# Patient Record
Sex: FEMALE | Race: ASIAN | NOT HISPANIC OR LATINO | URBAN - METROPOLITAN AREA
[De-identification: names, ages, dates, MRNs, and addresses within clinical notes are randomized per-mention and may not be internally consistent; named-entity substitution may affect disease eponyms.]

---

## 2024-01-01 ENCOUNTER — EMERGENCY (EMERGENCY)
Facility: HOSPITAL | Age: 54
LOS: 1 days | Discharge: HOME | End: 2024-01-01
Admitting: STUDENT IN AN ORGANIZED HEALTH CARE EDUCATION/TRAINING PROGRAM
Payer: MEDICAID

## 2024-01-01 VITALS
SYSTOLIC BLOOD PRESSURE: 156 MMHG | OXYGEN SATURATION: 98 % | WEIGHT: 100.09 LBS | HEIGHT: 55 IN | TEMPERATURE: 97 F | DIASTOLIC BLOOD PRESSURE: 88 MMHG | HEART RATE: 69 BPM | RESPIRATION RATE: 16 BRPM

## 2024-01-01 DIAGNOSIS — H11.32 CONJUNCTIVAL HEMORRHAGE, LEFT EYE: ICD-10-CM

## 2024-01-01 DIAGNOSIS — H57.89 OTHER SPECIFIED DISORDERS OF EYE AND ADNEXA: ICD-10-CM

## 2024-01-01 PROCEDURE — 99283 EMERGENCY DEPT VISIT LOW MDM: CPT

## 2024-01-01 NOTE — ED PROVIDER NOTE - NSFOLLOWUPINSTRUCTIONS_ED_ALL_ED_FT
You may call [phone] to schedule an appointment with an ophthalmologist.     Subconjunctival Hemorrhage  Subconjunctival hemorrhage is bleeding that happens between the white part of your eye (sclera) and the clear membrane that covers the outside of your eye (conjunctiva). There are many tiny blood vessels near the surface of your eye. A subconjunctival hemorrhage happens when one or more of these vessels breaks and bleeds, causing a red patch to appear on your eye. This is similar to a bruise.    Depending on the amount of bleeding, the red patch may only cover a small area of your eye or it may cover the entire visible part of the sclera. If a lot of blood collects under the conjunctiva, there may also be swelling. Subconjunctival hemorrhages do not affect your vision or cause pain, but your eye may feel irritated if there is swelling. Subconjunctival hemorrhages usually do not require treatment, and they usually disappear on their own within two to four weeks.    What are the causes?  This condition may be caused by:  Mild trauma, such as rubbing your eye too hard.  Blunt injuries, such as from playing sports or coming into contact with a deployed airbag.  Coughing, sneezing, or vomiting.  Straining, such as when lifting a heavy object.  Medical conditions, such as:  High blood pressure.  Diabetes.  Recent eye surgery.  Certain medicines, especially blood thinners (anticoagulants), including aspirin.  Other conditions, such as eye tumors, bleeding disorders, or blood vessel abnormalities.  Subconjunctival hemorrhages can also happen without an obvious cause.    What are the signs or symptoms?  Eyes showing the white of the left eye completely red.  Symptoms of this condition include:  A bright red or dark red patch on the white part of the eye. The red area may:  Spread out to cover a larger area of the eye before it goes away.  Turn colors such as pink or brownish-yellow before it goes away.  Swelling around the eye.  Mild eye irritation.  How is this diagnosed?  This condition is diagnosed with a physical exam. If your subconjunctival hemorrhage was caused by trauma, your health care provider may refer you to an eye specialist (ophthalmologist) or another specialist to check for other injuries. You may have other tests, including:  An eye exam including a vision test, checking your eye with a type of microscope (slit lamp) and measuring the pressure in your eye. Your eye may be dilated, especially if your subconjunctival hemorrhage was caused by trauma.  A blood pressure check.  Blood tests to check for bleeding disorders.  If your subconjunctival hemorrhage was caused by trauma, X-rays or a CT scan may be done to check for other injuries.    How is this treated?  Usually, treatment is not needed for this condition. If you have discomfort, your health care provider may recommend eye drops or cold compresses.    Follow these instructions at home:  Take over-the-counter and prescription medicines only as directed by your health care provider.  Use eye drops or cold compresses to help with discomfort as directed by your health care provider.  Avoid activities, things, and environments that may irritate or injure your eye.  Keep all follow-up visits. This is important.  Contact a health care provider if:  You have pain in your eye.  The bleeding does not go away within 4 weeks.  You keep getting new subconjunctival hemorrhages.  Get help right away if:  Your vision changes, you have difficulty seeing, or you develop double vision.  You suddenly develop severe sensitivity to light.  You develop a severe headache, persistent vomiting, confusion, or abnormal tiredness (lethargy).  Your eye seems to bulge or protrude from your eye socket.  You develop unexplained bruises on your body.  You have unexplained bleeding in another area of your body.  These symptoms may represent a serious problem that is an emergency. Do not wait to see if the symptoms will go away. Get medical help right away. Call your local emergency services (911 in the [place] Do not drive yourself to the hospital.    Summary  Subconjunctival hemorrhage is bleeding that happens between the white part of your eye and the clear membrane that covers the outside of your eye.  This condition is similar to a bruise.  Subconjunctival hemorrhages usually do not require treatment, and they usually disappear on their own within two to four weeks.  Use eye drops or cold compresses to help with discomfort as directed by your health care provider.  This information is not intended to replace advice given to you by your health care provider. Make sure you discuss any questions you have with your health care provider. You may call [phone] to schedule an appointment with an ophthalmologist.     Subconjunctival Hemorrhage  Subconjunctival hemorrhage is bleeding that happens between the white part of your eye (sclera) and the clear membrane that covers the outside of your eye (conjunctiva). There are many tiny blood vessels near the surface of your eye. A subconjunctival hemorrhage happens when one or more of these vessels breaks and bleeds, causing a red patch to appear on your eye. This is similar to a bruise.    Depending on the amount of bleeding, the red patch may only cover a small area of your eye or it may cover the entire visible part of the sclera. If a lot of blood collects under the conjunctiva, there may also be swelling. Subconjunctival hemorrhages do not affect your vision or cause pain, but your eye may feel irritated if there is swelling. Subconjunctival hemorrhages usually do not require treatment, and they usually disappear on their own within two to four weeks.    What are the causes?  This condition may be caused by:  Mild trauma, such as rubbing your eye too hard.  Blunt injuries, such as from playing sports or coming into contact with a deployed airbag.  Coughing, sneezing, or vomiting.  Straining, such as when lifting a heavy object.  Medical conditions, such as:  High blood pressure.  Diabetes.  Recent eye surgery.  Certain medicines, especially blood thinners (anticoagulants), including aspirin.  Other conditions, such as eye tumors, bleeding disorders, or blood vessel abnormalities.  Subconjunctival hemorrhages can also happen without an obvious cause.    What are the signs or symptoms?  Eyes showing the white of the left eye completely red.  Symptoms of this condition include:  A bright red or dark red patch on the white part of the eye. The red area may:  Spread out to cover a larger area of the eye before it goes away.  Turn colors such as pink or brownish-yellow before it goes away.  Swelling around the eye.  Mild eye irritation.  How is this diagnosed?  This condition is diagnosed with a physical exam. If your subconjunctival hemorrhage was caused by trauma, your health care provider may refer you to an eye specialist (ophthalmologist) or another specialist to check for other injuries. You may have other tests, including:  An eye exam including a vision test, checking your eye with a type of microscope (slit lamp) and measuring the pressure in your eye. Your eye may be dilated, especially if your subconjunctival hemorrhage was caused by trauma.  A blood pressure check.  Blood tests to check for bleeding disorders.  If your subconjunctival hemorrhage was caused by trauma, X-rays or a CT scan may be done to check for other injuries.    How is this treated?  Usually, treatment is not needed for this condition. If you have discomfort, your health care provider may recommend eye drops or cold compresses.    Follow these instructions at home:  Take over-the-counter and prescription medicines only as directed by your health care provider.  Use eye drops or cold compresses to help with discomfort as directed by your health care provider.  Avoid activities, things, and environments that may irritate or injure your eye.  Keep all follow-up visits. This is important.  Contact a health care provider if:  You have pain in your eye.  The bleeding does not go away within 4 weeks.  You keep getting new subconjunctival hemorrhages.  Get help right away if:  Your vision changes, you have difficulty seeing, or you develop double vision.  You suddenly develop severe sensitivity to light.  You develop a severe headache, persistent vomiting, confusion, or abnormal tiredness (lethargy).  Your eye seems to bulge or protrude from your eye socket.  You develop unexplained bruises on your body.  You have unexplained bleeding in another area of your body.  These symptoms may represent a serious problem that is an emergency. Do not wait to see if the symptoms will go away. Get medical help right away. Call your local emergency services (911 in the [place] Do not drive yourself to the hospital.    Summary  Subconjunctival hemorrhage is bleeding that happens between the white part of your eye and the clear membrane that covers the outside of your eye.  This condition is similar to a bruise.  Subconjunctival hemorrhages usually do not require treatment, and they usually disappear on their own within two to four weeks.  Use eye drops or cold compresses to help with discomfort as directed by your health care provider.  This information is not intended to replace advice given to you by your health care provider. Make sure you discuss any questions you have with your health care provider.

## 2024-01-01 NOTE — ED ADULT NURSE NOTE - NSFALLUNIVINTERV_ED_ALL_ED
Bed/Stretcher in lowest position, wheels locked, appropriate side rails in place/Call bell, personal items and telephone in reach/Instruct patient to call for assistance before getting out of bed/chair/stretcher/Non-slip footwear applied when patient is off stretcher/[place] to call system/Physically safe environment - no spills, clutter or unnecessary equipment/Purposeful proactive rounding/Room/bathroom lighting operational, light cord in reach Bed/Stretcher in lowest position, wheels locked, appropriate side rails in place/Call bell, personal items and telephone in reach/Instruct patient to call for assistance before getting out of bed/chair/stretcher/Non-slip footwear applied when patient is off stretcher/[place] to call system/Physically safe environment - no spills, clutter or unnecessary equipment/Purposeful proactive rounding/Room/bathroom lighting operational, light cord in reach

## 2024-01-01 NOTE — ED PROVIDER NOTE - CLINICAL SUMMARY MEDICAL DECISION MAKING FREE TEXT BOX
52 y/o female w/ no sig pmh p/w L eye redness x 2 days.  Notes mild discomfort.  Denies blurry vision, flashing lights, floaters, ha, dizziness.  Notes on 12/28 and 12/29 had diarrhea, which has self resolved.  Denies known preceding event or trauma.  Denies f/c, abd pain, n/v.  Denies asa/ ac use.  Notes hx similar in past, which self resolved.  Denies contact lense use.  Has reading glasses.  Exam notable for L subconjunctival hemorrhage.  No signs corneal abrasion  D/w pt dx and supportive care  Will dc with ophthalmology f/u prn 54 y/o female w/ no sig pmh p/w L eye redness x 2 days.  Notes mild discomfort.  Denies blurry vision, flashing lights, floaters, ha, dizziness.  Notes on 12/28 and 12/29 had diarrhea, which has self resolved.  Denies known preceding event or trauma.  Denies f/c, abd pain, n/v.  Denies asa/ ac use.  Notes hx similar in past, which self resolved.  Denies contact lense use.  Has reading glasses.  Exam notable for L subconjunctival hemorrhage.  No signs corneal abrasion  D/w pt dx and supportive care  Will dc with ophthalmology f/u prn

## 2024-01-01 NOTE — ED PROVIDER NOTE - OBJECTIVE STATEMENT
54 y/o female w/ no sig pmh p/w L eye redness x 2 days.  Notes mild discomfort.  Denies blurry vision, flashing lights, floaters, ha, dizziness.  Notes on 12/28 and 12/29 had diarrhea, which has self resolved.  Denies known preceding event or trauma.  Denies f/c, abd pain, n/v.  Denies asa/ ac use.  Notes hx similar in past, which self resolved.  Denies contact lense use.  Has reading glasses.

## 2024-01-01 NOTE — ED PROVIDER NOTE - PATIENT PORTAL LINK FT
You can access the FollowMyHealth Patient Portal offered by [facility] by registering at the following website: [email or web address]. By joining [employer]’s FollowMyHealth portal, you will also be able to view your health information using other applications (apps) compatible with our system. You can access the FollowMyHealth Patient Portal offered by [facility] by registering at the following website: [email or web address]. By joining [employer]’s FollowMyHealth portal, you will also be able to view your health information using other applications (apps) compatible with our system.

## 2024-01-01 NOTE — ED ADULT NURSE NOTE - OBJECTIVE STATEMENT
53F no known prior medical hx presents c/o eye redness x2 days. denies anticoagulants, blurred vision, eye discharge, double vision or eye trauma. pt reports she had diarrhea "30 hours before this started." pt also reports recurrent eye redness for 5 years. pt speaking in clear, complete sentences. RR easy, even, un-labored on room air

## 2024-01-01 NOTE — ED PROVIDER NOTE - PHYSICAL EXAMINATION
CONSTITUTIONAL: Awake, alert.  Nontoxic, no acute distress.    HEAD: Normocephalic, atraumatic.    EYES: Normal appearing lids.  L eye with subconjunctival hemorrhage to nasal aspect eye.  R Conjunctivae clear without exudates or hemorrhage. Sclera is non-icteric.  PERRL.  EOMI.  VA 20/10 b/l, left and right (corrected), Negative uptake on fluorescein exam.    ENT: Normal appearing external ears, nose, mucous membranes moist.    NECK: supple, trachea midline.    ABDOMEN: Normal appearing skin without lesions, rashes.  Normal bowel sounds x 4.  Soft, non-distended, non-tender in all four quadrants. No rebound or guarding. No hernias or masses palpable.  No pulsatile abdominal mass.   No CVA tenderness b/l.    MUSCULOSKELETAL:  Moving all extremities without issue.    SKIN: Skin in warm, dry and intact without rashes or lesions.  Appropriate color for ethnicity.    NEUROLOGICAL:  Patient is alert, oriented x person, place and time.    PSYCH: Appropriate mood and affect. Good judgment and insight.

## 2024-01-01 NOTE — ED PROVIDER NOTE - NS ED ROS FT
CONSTITUTIONAL: Denies fever and chills    HEENT: See HPI    RESPIRATORY: Denies SOB     CARDIOVASCULAR: Denies chest pain.    GASTROINTESTINAL: See HPI

## 2024-01-01 NOTE — ED ADULT TRIAGE NOTE - CHIEF COMPLAINT QUOTE
Pt presents to ED C/O R eye redness and discomfort. Denies vision changes states, " I have been having dry eyes recently and I miryam had diarrhea last Thurs. the redness started after I'm not sure if it is related". Denies vomiting/ known injury. YOLETTE.

## 2024-01-09 PROBLEM — Z78.9 OTHER SPECIFIED HEALTH STATUS: Chronic | Status: ACTIVE | Noted: 2024-01-01

## 2024-02-16 ENCOUNTER — APPOINTMENT (OUTPATIENT)
Dept: OPHTHALMOLOGY | Facility: CLINIC | Age: 54
End: 2024-02-16
Payer: MEDICAID

## 2024-02-16 ENCOUNTER — NON-APPOINTMENT (OUTPATIENT)
Age: 54
End: 2024-02-16

## 2024-02-16 PROCEDURE — 92004 COMPRE OPH EXAM NEW PT 1/>: CPT

## 2024-03-01 ENCOUNTER — TRANSCRIPTION ENCOUNTER (OUTPATIENT)
Age: 54
End: 2024-03-01

## 2024-04-16 ENCOUNTER — NON-APPOINTMENT (OUTPATIENT)
Age: 54
End: 2024-04-16

## 2024-04-16 ENCOUNTER — LABORATORY RESULT (OUTPATIENT)
Age: 54
End: 2024-04-16

## 2024-04-16 ENCOUNTER — APPOINTMENT (OUTPATIENT)
Dept: INTERNAL MEDICINE | Facility: CLINIC | Age: 54
End: 2024-04-16
Payer: MEDICAID

## 2024-04-16 VITALS
WEIGHT: 98.99 LBS | SYSTOLIC BLOOD PRESSURE: 152 MMHG | DIASTOLIC BLOOD PRESSURE: 92 MMHG | OXYGEN SATURATION: 97 % | BODY MASS INDEX: 22.91 KG/M2 | HEART RATE: 80 BPM | TEMPERATURE: 98.3 F | HEIGHT: 55 IN

## 2024-04-16 VITALS — SYSTOLIC BLOOD PRESSURE: 154 MMHG | DIASTOLIC BLOOD PRESSURE: 95 MMHG

## 2024-04-16 DIAGNOSIS — I10 ESSENTIAL (PRIMARY) HYPERTENSION: ICD-10-CM

## 2024-04-16 DIAGNOSIS — M89.661 OSTEOPATHY AFTER POLIOMYELITIS, RIGHT LOWER LEG: ICD-10-CM

## 2024-04-16 DIAGNOSIS — Z12.11 ENCOUNTER FOR SCREENING FOR MALIGNANT NEOPLASM OF COLON: ICD-10-CM

## 2024-04-16 DIAGNOSIS — B91 OSTEOPATHY AFTER POLIOMYELITIS, LEFT LOWER LEG: ICD-10-CM

## 2024-04-16 DIAGNOSIS — B91 OSTEOPATHY AFTER POLIOMYELITIS, RIGHT LOWER LEG: ICD-10-CM

## 2024-04-16 DIAGNOSIS — R53.81 OTHER MALAISE: ICD-10-CM

## 2024-04-16 DIAGNOSIS — Z12.39 ENCOUNTER FOR OTHER SCREENING FOR MALIGNANT NEOPLASM OF BREAST: ICD-10-CM

## 2024-04-16 DIAGNOSIS — E78.00 PURE HYPERCHOLESTEROLEMIA, UNSPECIFIED: ICD-10-CM

## 2024-04-16 DIAGNOSIS — M89.662 OSTEOPATHY AFTER POLIOMYELITIS, LEFT LOWER LEG: ICD-10-CM

## 2024-04-16 DIAGNOSIS — Z00.00 ENCOUNTER FOR GENERAL ADULT MEDICAL EXAMINATION W/OUT ABNORMAL FINDINGS: ICD-10-CM

## 2024-04-16 PROCEDURE — 93000 ELECTROCARDIOGRAM COMPLETE: CPT

## 2024-04-16 PROCEDURE — 99204 OFFICE O/P NEW MOD 45 MIN: CPT | Mod: 25

## 2024-04-17 LAB
ALBUMIN SERPL ELPH-MCNC: 4 G/DL
ALP BLD-CCNC: 65 U/L
ALT SERPL-CCNC: 16 U/L
ANION GAP SERPL CALC-SCNC: 10 MMOL/L
AST SERPL-CCNC: 26 U/L
BILIRUB SERPL-MCNC: 0.4 MG/DL
BUN SERPL-MCNC: 15 MG/DL
CALCIUM SERPL-MCNC: 9.2 MG/DL
CHLORIDE SERPL-SCNC: 108 MMOL/L
CHOLEST SERPL-MCNC: 243 MG/DL
CO2 SERPL-SCNC: 24 MMOL/L
CREAT SERPL-MCNC: 0.81 MG/DL
EGFR: 86 ML/MIN/1.73M2
ESTIMATED AVERAGE GLUCOSE: 108 MG/DL
GLUCOSE SERPL-MCNC: 108 MG/DL
HBA1C MFR BLD HPLC: 5.4 %
HCT VFR BLD CALC: 36.9 %
HDLC SERPL-MCNC: 87 MG/DL
HGB BLD-MCNC: 12 G/DL
LDLC SERPL CALC-MCNC: 142 MG/DL
MCHC RBC-ENTMCNC: 30.5 PG
MCHC RBC-ENTMCNC: 32.5 GM/DL
MCV RBC AUTO: 93.7 FL
NONHDLC SERPL-MCNC: 155 MG/DL
PLATELET # BLD AUTO: 178 K/UL
POTASSIUM SERPL-SCNC: 4 MMOL/L
PROT SERPL-MCNC: 6.5 G/DL
RBC # BLD: 3.94 M/UL
RBC # FLD: 12.6 %
SODIUM SERPL-SCNC: 142 MMOL/L
TRIGL SERPL-MCNC: 80 MG/DL
TSH SERPL-ACNC: 6.05 UIU/ML
WBC # FLD AUTO: 5.49 K/UL

## 2024-06-18 ENCOUNTER — LABORATORY RESULT (OUTPATIENT)
Age: 54
End: 2024-06-18

## 2024-06-18 ENCOUNTER — APPOINTMENT (OUTPATIENT)
Dept: INTERNAL MEDICINE | Facility: CLINIC | Age: 54
End: 2024-06-18
Payer: MEDICAID

## 2024-06-18 VITALS
HEIGHT: 55 IN | DIASTOLIC BLOOD PRESSURE: 81 MMHG | BODY MASS INDEX: 23.03 KG/M2 | WEIGHT: 99.5 LBS | SYSTOLIC BLOOD PRESSURE: 124 MMHG | HEART RATE: 86 BPM | OXYGEN SATURATION: 96 % | TEMPERATURE: 98.1 F

## 2024-06-18 DIAGNOSIS — R73.09 OTHER ABNORMAL GLUCOSE: ICD-10-CM

## 2024-06-18 DIAGNOSIS — E03.9 HYPOTHYROIDISM, UNSPECIFIED: ICD-10-CM

## 2024-06-18 PROCEDURE — 99214 OFFICE O/P EST MOD 30 MIN: CPT | Mod: 25

## 2024-06-18 NOTE — ASSESSMENT
[FreeTextEntry1] :  alicia 53 yr old w hx polio weak right leg, but ambulates independently   at risk for pulm infection w collapsed spine/ shortened torso/ kyphoscoliosis   see endocrinologist s/p rad I rx; likely needs synthroid now, reluctant   applications for [employer] work in [employer] and [email or web address]; completed two forms for her

## 2024-06-19 LAB
ESTIMATED AVERAGE GLUCOSE: 105 MG/DL
HBA1C MFR BLD HPLC: 5.3 %
TSH SERPL-ACNC: 5.49 UIU/ML